# Patient Record
Sex: MALE | Race: WHITE | Employment: UNEMPLOYED | URBAN - METROPOLITAN AREA
[De-identification: names, ages, dates, MRNs, and addresses within clinical notes are randomized per-mention and may not be internally consistent; named-entity substitution may affect disease eponyms.]

---

## 2019-04-25 ENCOUNTER — HOSPITAL ENCOUNTER (EMERGENCY)
Age: 39
Discharge: HOME | End: 2019-04-25
Payer: MEDICAID

## 2019-04-25 VITALS
TEMPERATURE: 97.5 F | RESPIRATION RATE: 16 BRPM | HEART RATE: 76 BPM | BODY MASS INDEX: 27.74 KG/M2 | WEIGHT: 183 LBS | HEIGHT: 68 IN | OXYGEN SATURATION: 98 % | DIASTOLIC BLOOD PRESSURE: 68 MMHG | SYSTOLIC BLOOD PRESSURE: 113 MMHG

## 2019-04-25 DIAGNOSIS — R32 ENURESIS: Primary | ICD-10-CM

## 2019-04-25 DIAGNOSIS — F81.89 NON-VERBAL LEARNING DISORDER: ICD-10-CM

## 2019-04-25 LAB
BILIRUBIN URINE: NEGATIVE
BLOOD, URINE: NEGATIVE
CLARITY: CLEAR
COLOR: YELLOW
GLUCOSE BLD-MCNC: 93 MG/DL (ref 70–99)
GLUCOSE URINE: NEGATIVE MG/DL
KETONES, URINE: NEGATIVE MG/DL
LEUKOCYTE ESTERASE, URINE: NEGATIVE
MICROSCOPIC EXAMINATION: NORMAL
NITRITE, URINE: NEGATIVE
PERFORMED ON: NORMAL
PH UA: 7 (ref 5–8)
PROTEIN UA: NEGATIVE MG/DL
SPECIFIC GRAVITY UA: <=1.005 (ref 1–1.03)
URINE REFLEX TO CULTURE: NORMAL
URINE TYPE: NORMAL
UROBILINOGEN, URINE: 0.2 E.U./DL

## 2019-04-25 PROCEDURE — 99283 EMERGENCY DEPT VISIT LOW MDM: CPT

## 2019-04-25 PROCEDURE — 81003 URINALYSIS AUTO W/O SCOPE: CPT

## 2019-04-25 NOTE — ED PROVIDER NOTES
[address] ED  eMERGENCY dEPARTMENT eNCOUnter        Pt Name: Kathi Bosworth  MRN: [id number]  [place] 1980  Date of evaluation: 4/25/2019  Provider: LILIYA Mabry - SHARONA  PCP: Narayan Haines MD  ED Attending: No att. providers found    [address] [place] [address]       Chief Complaint   Patient presents with    Urinary Frequency     pt caregivers concerned that pt is uriated more frequent. Pt has been wetting the bed and is not his norm. HISTORY OF PRESENT ILLNESS   (Location/Symptom, Timing/Onset, Context/Setting, Quality, Duration, Modifying Factors, Severity)  Note limiting factors. Kathi Bosworth is a 43 y.o. male for incontinence. Onset was 2 weeks. Duration has been since the onset. Context includes caregivers report he has been wetting the bed for the past 2 weeks and on some nights twice. Pt has been taking DDAVP 0.1mg for the past year and this has helped however in the last 2 weeks the incontinence has returned. Caregivers report not other symptoms and report he is eating and pooping per his baseline. Alleviating factors include nothing. Aggravating factors include nothing. Pain is 0/10. nothing has been used for pain today. Nursing Notes were all reviewed and agreed with or any disagreements were addressed  in the HPI. REVIEW OF SYSTEMS  (2-9 systems for level 4, 10 or more for level 5)     Review of Systems   Unable to perform ROS: Patient nonverbal       Positivesand Pertinent negatives as per HPI. Except as noted above in the ROS, all other systems were reviewed and negative.        PAST MEDICAL HISTORY     Past Medical History:   Diagnosis Date    Autism     Mental retardation     Pica          SURGICAL HISTORY       Past Surgical History:   Procedure Laterality Date    WISDOM TOOTH EXTRACTION           CURRENT MEDICATIONS       Discharge Medication List as of 4/25/2019  1:56 PM      CONTINUE these medications which have NOT CHANGED Details   escitalopram (LEXAPRO) 10 MG tablet Take 10 mg by mouth dailyHistorical Med      vitamin D (CHOLECALCIFEROL) 1000 UNIT TABS tablet Take 1,000 Units by mouth dailyHistorical Med      Fluticasone Propionate (FLONASE NA) 50 mg by Nasal route 2 times dailyHistorical Med      desmopressin (DDAVP) 0.1 MG tablet Take 0.1 mg by mouth dailyHistorical Med      QUEtiapine (SEROQUEL) 25 MG tablet Take 25 mg by mouth 2 times dailyHistorical Med               ALLERGIES     Patient has no known allergies. FAMILY HISTORY     History reviewed. No pertinent family history.       SOCIAL HISTORY       Social History     Socioeconomic History    Marital status: Single     Spouse name: None    Number of children: None    Years of education: None    Highest education level: None   Occupational History    None   Social Needs    Financial resource strain: None    Food insecurity:     Worry: None     Inability: None    Transportation needs:     Medical: None     Non-medical: None   Tobacco Use    Smoking status: Never Smoker    Smokeless tobacco: Never Used   Substance and Sexual Activity    Alcohol use: No    Drug use: No    Sexual activity: None   Lifestyle    Physical activity:     Days per week: None     Minutes per session: None    Stress: None   Relationships    Social connections:     Talks on phone: None     Gets together: None     Attends Yazidi service: None     Active member of club or organization: None     Attends meetings of clubs or organizations: None     Relationship status: None    Intimate partner violence:     Fear of current or ex partner: None     Emotionally abused: None     Physically abused: None     Forced sexual activity: None   Other Topics Concern    None   Social History Narrative    None       SCREENINGS             PHYSICAL EXAM    (up to 7 for level 4, 8 ormore for level 5)     ED Triage Vitals   BP Temp Temp Source Pulse Resp SpO2 Height Weight   04/25/19 1247 04/25/19 1245 04/25/19 1245 04/25/19 1245 04/25/19 1245 04/25/19 1245 04/25/19 1245 04/25/19 1245   113/68 97.5 °F (36.4 °C) Oral 76 16 98 % 5' 8\" (1.727 m) 183 lb (83 kg)       Physical Exam   Constitutional: He is oriented to person, place, and time. He appears well-developed and well-nourished. HENT:   Head: Normocephalic and atraumatic. Neck: Normal range of motion. Cardiovascular: Normal rate. Pulmonary/Chest: Effort normal. No respiratory distress. Abdominal: Soft. He exhibits no distension. There is no tenderness. Musculoskeletal: Normal range of motion. Neurological: He is alert and oriented to person, place, and time. Skin: Skin is warm and dry. Psychiatric: He has a normal mood and affect. DIAGNOSTIC RESULTS   LABS:    Labs Reviewed   URINE RT REFLEX TO CULTURE    Narrative:     Performed at:  [facility],  [place], [facility]   Phone [phone]   POCT GLUCOSE    Narrative:     Performed at:  [facility]) [facility] [address],  [place], [facility]   Phone [phone]   POCT GLUCOSE       All other labs were within normal range or notreturned as of this dictation. EKG: All EKG's are interpreted by the Emergency Department Physician who either signs or Co-signs this chart in the absence of a cardiologist.  Please see their note for interpretation of EKG. RADIOLOGY:         Interpretation per the Radiologist below, if available at the time of this note:    No orders to display     No results found.       PROCEDURES   Unless otherwise noted below, none     Procedures    CRITICAL CARE TIME   N/A    CONSULTS:  None      EMERGENCY DEPARTMENT COURSE and DIFFERENTIAL DIAGNOSIS/MDM:   Vitals:    Vitals:    04/25/19 1245 04/25/19 1247   BP:  113/68   Pulse: 76    Resp: 16    Temp: 97.5 °F (36.4 °C)    TempSrc: Oral    SpO2: 98%    Weight: 183 lb (83 kg)    Height: 5' 8\" (1.727 m)        Patient was given the following medications:  Medications - No data to display    Patient was seen and evaluated by myself. Patient provided for urinary frequency. Patient has a history of baseline MRDD and is nonverbal.  Patient was brought in by his caregiver state that he has had increased bedwetting at night. They state that he is even been wetting the bed and twice in 1 night. On exam he is awake and alert hemodynamically stable nontoxic in appearance. They report no other symptoms and states that he's been eating and drinking okay. Bowel movements have been regular. Patient is extremely playful and interactive in the room however he is nonverbal.  Urine was reviewed and was negative for any concerns for UTI. He developed blood glucose of 93. Patient will be referred to his primary care doctor for follow-up and reevaluation and possible medication adjustment. Caregivers report that he is currently on DDAVP for his enuresis. He was encouraged to return to the ED for worsening symptoms and establish his primary care doctor in the next few days. Patient was discharged home with all questions answered. I did discuss the case with Dr. Ya Lao although he did not the patient he was in agreement with the plan. The patient tolerated their visit well. They were seen and evaluated by the attendingphysician, No att. providers found who agreed with the assessment and plan. The patient and / or the family were informed of the results of any tests, a time was given to answer questions, a plan was proposed and they agreed [facility]. FINAL IMPRESSION      1. Enuresis    2.  Non-verbal learning disorder          DISPOSITION/PLAN   DISPOSITION Decision To Discharge 04/25/2019 01:55:29 PM      PATIENT REFERRED TO:  Jose Weeks MD  [address]  [phone]    Schedule an appointment as soon as possible for a visit in 2 days  for re-evaluation    [place] [facility]  [id number] [address]

## 2019-07-11 ENCOUNTER — HOSPITAL ENCOUNTER (OUTPATIENT)
Dept: GENERAL RADIOLOGY | Age: 39
Discharge: HOME | End: 2019-07-11
Payer: MEDICAID

## 2019-07-11 ENCOUNTER — HOSPITAL ENCOUNTER (OUTPATIENT)
Age: 39
Discharge: HOME | End: 2019-07-11
Payer: MEDICAID

## 2019-07-11 DIAGNOSIS — S93.422A SPRAIN OF DELTOID LIGAMENT OF LEFT ANKLE, INITIAL ENCOUNTER: ICD-10-CM

## 2019-07-11 PROCEDURE — 73600 X-RAY EXAM OF ANKLE: CPT
